# Patient Record
Sex: FEMALE | Race: WHITE | NOT HISPANIC OR LATINO | Employment: OTHER | URBAN - METROPOLITAN AREA
[De-identification: names, ages, dates, MRNs, and addresses within clinical notes are randomized per-mention and may not be internally consistent; named-entity substitution may affect disease eponyms.]

---

## 2017-04-01 ENCOUNTER — HOSPITAL ENCOUNTER (EMERGENCY)
Facility: HOSPITAL | Age: 62
Discharge: HOME | End: 2017-04-01
Attending: EMERGENCY MEDICINE
Payer: MEDICARE

## 2017-04-01 VITALS
SYSTOLIC BLOOD PRESSURE: 119 MMHG | WEIGHT: 246 LBS | TEMPERATURE: 98 F | HEART RATE: 82 BPM | OXYGEN SATURATION: 97 % | BODY MASS INDEX: 45.27 KG/M2 | HEIGHT: 62 IN | RESPIRATION RATE: 17 BRPM | DIASTOLIC BLOOD PRESSURE: 57 MMHG

## 2017-04-01 DIAGNOSIS — R10.13 EPIGASTRIC PAIN: Primary | ICD-10-CM

## 2017-04-01 LAB
ALBUMIN SERPL BCP-MCNC: 3.9 G/DL
ALP SERPL-CCNC: 123 U/L
ALT SERPL W/O P-5'-P-CCNC: 38 U/L
ANION GAP SERPL CALC-SCNC: 12 MMOL/L
AST SERPL-CCNC: 41 U/L
BACTERIA #/AREA URNS HPF: ABNORMAL /HPF
BASOPHILS # BLD AUTO: 0.02 K/UL
BASOPHILS NFR BLD: 0.2 %
BILIRUB SERPL-MCNC: 0.7 MG/DL
BILIRUB UR QL STRIP: NEGATIVE
BUN SERPL-MCNC: 17 MG/DL
CALCIUM SERPL-MCNC: 9.9 MG/DL
CHLORIDE SERPL-SCNC: 103 MMOL/L
CLARITY UR: ABNORMAL
CO2 SERPL-SCNC: 22 MMOL/L
COLOR UR: ABNORMAL
CREAT SERPL-MCNC: 1.7 MG/DL
DIFFERENTIAL METHOD: ABNORMAL
EOSINOPHIL # BLD AUTO: 0.2 K/UL
EOSINOPHIL NFR BLD: 2.5 %
ERYTHROCYTE [DISTWIDTH] IN BLOOD BY AUTOMATED COUNT: 13.4 %
EST. GFR  (AFRICAN AMERICAN): 37 ML/MIN/1.73 M^2
EST. GFR  (NON AFRICAN AMERICAN): 32 ML/MIN/1.73 M^2
GLUCOSE SERPL-MCNC: 185 MG/DL
GLUCOSE UR QL STRIP: NEGATIVE
HCT VFR BLD AUTO: 46 %
HGB BLD-MCNC: 15 G/DL
HGB UR QL STRIP: NEGATIVE
HYALINE CASTS #/AREA URNS LPF: 0 /LPF
KETONES UR QL STRIP: ABNORMAL
LEUKOCYTE ESTERASE UR QL STRIP: ABNORMAL
LIPASE SERPL-CCNC: 27 U/L
LYMPHOCYTES # BLD AUTO: 2.5 K/UL
LYMPHOCYTES NFR BLD: 29.3 %
MCH RBC QN AUTO: 31.9 PG
MCHC RBC AUTO-ENTMCNC: 32.6 %
MCV RBC AUTO: 98 FL
MICROSCOPIC COMMENT: ABNORMAL
MONOCYTES # BLD AUTO: 1.2 K/UL
MONOCYTES NFR BLD: 14 %
NEUTROPHILS # BLD AUTO: 4.6 K/UL
NEUTROPHILS NFR BLD: 54 %
NITRITE UR QL STRIP: NEGATIVE
PH UR STRIP: 5 [PH] (ref 5–8)
PLATELET # BLD AUTO: 234 K/UL
PMV BLD AUTO: 10.9 FL
POTASSIUM SERPL-SCNC: 4.6 MMOL/L
PROT SERPL-MCNC: 8.1 G/DL
PROT UR QL STRIP: ABNORMAL
RBC # BLD AUTO: 4.7 M/UL
RBC #/AREA URNS HPF: 0 /HPF (ref 0–4)
SODIUM SERPL-SCNC: 137 MMOL/L
SP GR UR STRIP: 1.02 (ref 1–1.03)
SQUAMOUS #/AREA URNS HPF: 12 /HPF
URN SPEC COLLECT METH UR: ABNORMAL
UROBILINOGEN UR STRIP-ACNC: NEGATIVE EU/DL
WBC # BLD AUTO: 8.57 K/UL
WBC #/AREA URNS HPF: 2 /HPF (ref 0–5)

## 2017-04-01 PROCEDURE — 81000 URINALYSIS NONAUTO W/SCOPE: CPT

## 2017-04-01 PROCEDURE — 25000003 PHARM REV CODE 250: Performed by: EMERGENCY MEDICINE

## 2017-04-01 PROCEDURE — 99283 EMERGENCY DEPT VISIT LOW MDM: CPT | Mod: 25

## 2017-04-01 PROCEDURE — 83690 ASSAY OF LIPASE: CPT

## 2017-04-01 PROCEDURE — 85025 COMPLETE CBC W/AUTO DIFF WBC: CPT

## 2017-04-01 PROCEDURE — 80053 COMPREHEN METABOLIC PANEL: CPT

## 2017-04-01 PROCEDURE — 96360 HYDRATION IV INFUSION INIT: CPT

## 2017-04-01 RX ORDER — SUCRALFATE 1 G/1
1 TABLET ORAL
Qty: 60 TABLET | Refills: 1 | Status: SHIPPED | OUTPATIENT
Start: 2017-04-01

## 2017-04-01 RX ORDER — ONDANSETRON 4 MG/1
8 TABLET, ORALLY DISINTEGRATING ORAL EVERY 12 HOURS PRN
Qty: 12 TABLET | Refills: 0 | Status: SHIPPED | OUTPATIENT
Start: 2017-04-01 | End: 2017-04-04

## 2017-04-01 RX ADMIN — SODIUM CHLORIDE 1000 ML: 0.9 INJECTION, SOLUTION INTRAVENOUS at 03:04

## 2017-04-01 NOTE — DISCHARGE INSTRUCTIONS
Epigastric Pain (Uncertain Cause)    Epigastric pain can be a sign of disease in the upper abdomen. Common causes include:  · Acid reflux (stomach acid flowing up into the esophagus)  · Gastritis (irritation of the stomach lining)  · Peptic Ulcer Disease  · Inflammation of the pancreas  · Gallstone  · Infection in the gallbladder  Pain may be dull or burning. It may spread upward to the chest or to the back. There may be other symptoms such as belching, bloating, cramps or hunger pains. There may be weight loss or poor appetite, nausea or vomiting.  Since the diagnosis of your pain is not certain yet, further tests may sometimes be needed. Sometimes the doctor will treat you for the most likely condition to see if there is improvement before doing further tests.  Home care  Medicines  · Antacids help neutralize the normal acids in your stomach. Examples are Maalox, Mylanta, Rolaids, and Tums. If you dont like the liquid, you can also try a chewable one. You may find one works better than another for you. Overuse can cause diarrhea or constipation.  · Acid blockers (H2 blockers) decrease acid production. Examples are cimetidine (Tagamet), famotidine (Pepcid) and ranitidine (Zantac).  · Acid inhibitors (PPIs) decrease acid production in a different way than the blockers. You may find they work better, but can take a little longer to take effect.  Examples are omeprazole (Prilosec), lansoprazole (Prevacid), pantoprazole (Protonix), rabeprazole (Aciphex), and esomeprazole (Nexium).  · Take an antacid 30-60 minutes after eating and at bedtime, but not at the same time as an acid blocker.  · Try not to take NSAIDs. Aspirin may also cause problems, but if taking it for your heart or other medical reasons, talk to your doctor before stopping it; you do not want to cause a worse problem, like a heart attack or stroke.  Diet  · If certain foods seem to cause your spasm, try to avoid them.   · Eat slowly and chew food well  before swallowing. Symptoms of gastritis can be worsened by certain foods. Limit or avoid fatty, fried, and spicy foods, as well as coffee, chocolate, mint, and foods with high acid content such as tomatoes and citrus fruit and juices (orange, grapefruit, lemon).  · Avoid alcohol, caffeine, and tobacco, which can delay healing and worsen your problem.  · Try eating smaller meals with snacks in between  Follow-up care  Follow up with your healthcare provider or as advised.  When to seek medical advice  Call your healthcare provider right away if any of the following occur:  · Stomach pain worsens or moves to the right lower part of the abdomen  · Chest pain appears, or if it worsens or spreads to the chest, back, neck, shoulder, or arm  · Frequent vomiting (cant keep down liquids)  · Blood in the stool or vomit (red or black color)  · Feeling weak or dizzy, fainting, or having trouble breathing  · Fever of 100.4ºF (38ºC) or higher, or as directed by your healthcare provider  · Abdominal swelling  Date Last Reviewed: 9/25/2015  © 2125-5387 [employer]. [address] [place] [address]. All rights reserved. This information is not intended as a substitute for professional medical care. Always follow your healthcare professional's instructions.

## 2017-04-01 NOTE — ED PROVIDER NOTES
"Encounter Date: 4/1/2017    SCRIBE #1 NOTE: I, Lorinkathy Goncalves, am scribing for, and in the presence of,  Jose Rahman MD. I have scribed the following portions of the note - Other sections scribed: HPI, ROS.       History     Chief Complaint   Patient presents with    Abdominal Pain     "Pancreatitis flaring up again" for 3 days now.    Diarrhea    Nausea     Review of patient's allergies indicates:   Allergen Reactions    Morphine Hives    Penicillins Hives    Sulfa (sulfonamide antibiotics) Hives     HPI Comments: CC: Abdominal Pain    HPI: This 61 year old female has a past medical history of anxiety, depression, diabetes mellitus, fibromyalgia, gout, high cholesterol, hypertension, polycystic kidney, and vertigo presents to the ED complaining of a three day history of epigastric abdominal pain radiating to her back. Pt reports abdominal pain is similar to pancreatitis pains she has experience in the past. During the time of the exam, pt says she feels 0/10 pain. Pt reports her PCP took her off of Mylanta, Tanzeum, and insulin. Pt was then placed on Humalog. Pt is allergic to morphine. No other symptoms reported.  Pain free now.       The history is provided by the patient.     Past Medical History:   Diagnosis Date    Anxiety     Depression     Diabetes mellitus     Fibromyalgia     Gout     High cholesterol     Hypertension     Polycystic kidney     Right    Vertigo      Past Surgical History:   Procedure Laterality Date    HYSTERECTOMY       No family history on file.  Social History   Substance Use Topics    Smoking status: Current Every Day Smoker    Smokeless tobacco: None    Alcohol use No     Review of Systems   Constitutional: Negative for fever.   HENT: Negative for sore throat.    Respiratory: Negative for shortness of breath.    Cardiovascular: Negative for chest pain.   Gastrointestinal: Positive for abdominal pain (epigastric radiating to back). Negative for nausea. "   Genitourinary: Negative for dysuria.   Musculoskeletal: Positive for back pain.   Skin: Negative for rash.   Neurological: Negative for weakness.   Hematological: Does not bruise/bleed easily.       Physical Exam   Initial Vitals   BP Pulse Resp Temp SpO2   04/01/17 1411 04/01/17 1411 04/01/17 1411 04/01/17 1411 04/01/17 1411   122/76 109 18 97.7 °F (36.5 °C) 96 %     Physical Exam    Vitals reviewed.  Constitutional: She appears well-developed and well-nourished.   HENT:   Head: Normocephalic and atraumatic.   Nose: Nose normal.   Mouth/Throat: No oropharyngeal exudate.   Eyes: EOM are normal. Pupils are equal, round, and reactive to light.   Neck: Normal range of motion. Neck supple. No JVD present.   Cardiovascular: Regular rhythm and normal heart sounds. Exam reveals no gallop and no friction rub.    No murmur heard.  Pulmonary/Chest: Breath sounds normal. No stridor. No respiratory distress. She has no wheezes. She has no rhonchi. She has no rales. She exhibits no tenderness.   Abdominal: Soft. Bowel sounds are normal. She exhibits no distension and no mass. There is no tenderness. There is no rebound and no guarding.   Musculoskeletal: Normal range of motion. She exhibits no edema or tenderness.   Neurological: She is alert and oriented to person, place, and time. She has normal strength. No sensory deficit.   Skin: Skin is warm and dry.   Psychiatric: She has a normal mood and affect. Thought content normal.         ED Course   Procedures  Labs Reviewed   CBC W/ AUTO DIFFERENTIAL - Abnormal; Notable for the following:        Result Value    MCH 31.9 (*)     Mono # 1.2 (*)     All other components within normal limits   COMPREHENSIVE METABOLIC PANEL - Abnormal; Notable for the following:     CO2 22 (*)     Glucose 185 (*)     Creatinine 1.7 (*)     AST 41 (*)     eGFR if  37 (*)     eGFR if non  32 (*)     All other components within normal limits   URINALYSIS - Abnormal;  Notable for the following:     Appearance, UA Cloudy (*)     Protein, UA 1+ (*)     Ketones, UA Trace (*)     Leukocytes, UA Trace (*)     All other components within normal limits   URINALYSIS MICROSCOPIC - Abnormal; Notable for the following:     Bacteria, UA Few (*)     All other components within normal limits   LIPASE        Medical decision-making:    The patient received a medical screening exam. If performed, the EKG was independently evaluated by me and is pending final cardiology evaluation.  If performed, all radiographic studies were independently evaluated by me and are pending final radiology evaluation. If labs were ordered, they were reviewed. Vital signs are independently assessed by me.  If performed, the pulse oximetry was independently evaluated by me.  I decided to obtain the patient's past medical record.  If available, I reviewed the patient's past medical record, including most recent labs and radiology reports.    This is an emergent evaluation of the patient complaining of abdominal pain.  My differential diagnosis includes mesenteric ischemia, obstruction, appendicitis, pancreatitis, cholecystitis, peptic ulcer disease,diverticulitis, colitis, volvulus, hernia, UTI, gastritis and gastroenteritis.    I decided to obtain and review the old medical record.    The patient does not clinically have gastroenteritis.  I consider, but doubt mesenteric ischemia, obstruction, appendicitis, cholecystitis, diverticulitis, colitis, and hernia.  The patient's urine is not infected.  The patient's labs are otherwise normal.  Lipase is normal. Doubt acute pancreatitis.     In the emergency department the patient was treated with:   Medications   sodium chloride 0.9% bolus 1,000 mL (0 mLs Intravenous Stopped 4/1/17 1632)         Pt is still pain free.     The patient will be discharged home with the following medications:  Patient will follow up with primary care physician.    The results and physical exam  findings were reviewed with the patient. Pt agrees with assessment, disposition and treatment plan and has no further questions or complaints at this time.      COURTNEY Rahman M.D. 8:31 AM 4/2/2017                    Scribe Attestation:   Scribe #1: I performed the above scribed service and the documentation accurately describes the services I performed. I attest to the accuracy of the note.    Attending Attestation:           Physician Attestation for Scribe:  Physician Attestation Statement for Scribe #1: I, Jose Rahman MD, reviewed documentation, as scribed by Devyn Goncalves in my presence, and it is both accurate and complete.                 ED Course     Clinical Impression:   The encounter diagnosis was Epigastric pain.          Jose Rahman MD  04/02/17 0832

## 2017-04-01 NOTE — ED TRIAGE NOTES
Pt presents to ED c/o pain on her right side behind her right flank that started two days ago.  States she also has nv and a fever last night.  States when she felt this pain in the past it was dx as pancreatitis.  Denies any pain at the moment.

## 2017-04-01 NOTE — ED AVS SNAPSHOT
[facility]  [address] [place] [address]               Mariah Arizmendi   2017  2:59 PM   ED    Description:  Female : 1955   Department:  [facility]           Your Care was Coordinated By:     Provider Role From To    Jose Rahman MD Attending Provider 17 1526 --      Reason for Visit     Abdominal Pain     Diarrhea     Nausea           Diagnoses this Visit        Comments    Epigastric pain    -  Primary       ED Disposition     ED Disposition Condition Comment    Discharge             To Do List           Follow-up Information     Schedule an appointment as soon as possible for a visit with Holly Brock MD.    Specialty:  Family Medicine    Contact information:    [address] [facility] [address]  [facility]  [facility] [id number] [phone]          Schedule an appointment as soon as possible for a visit with Renetta Sherwood MD.    Specialty:  Endocrinology    Contact information:    [address] [place] [address] [address]  [facility] [id number] [phone]          Follow up with [facility].    Specialty:  Emergency Medicine    Why:  As needed, If symptoms worsen    Contact information:    [address] [place] [address] [phone]       These Medications        Disp Refills Start End    sucralfate (CARAFATE) 1 gram tablet 60 tablet 1 2017     Take 1 tablet (1 g total) by mouth 4 (four) times daily before meals and nightly. - Oral    Pharmacy: [facility] [address] AT [facility] Ph #: [phone]       ondansetron (ZOFRAN-ODT) 4 MG TbDL 12 tablet 0 2017    Take 2 tablets (8 mg total) by mouth every 12 (twelve) hours as needed. - Oral    Pharmacy: [facility] [facility] [address] AT [facility] Ph #: [phone]         [facility]     [facility]  Care Line - 24/7 Assistance  Unless otherwise directed by your provider, please contact [facility], our nurse care line that is available for 24/7 assistance.     Registered nurses in the [facility] provide: appointment scheduling, clinical advisement, health education, and other advisory services.  Call: [phone] (toll free)               Medications           Message regarding Medications     Verify the changes and/or additions to your medication regime listed below are the same as discussed with your clinician today.  If any of these changes or additions are incorrect, please notify your healthcare provider.        START taking these NEW medications        Refills    sucralfate (CARAFATE) 1 gram tablet 1    Sig: Take 1 tablet (1 g total) by mouth 4 (four) times daily before meals and nightly.    Class: Print    Route: Oral    ondansetron (ZOFRAN-ODT) 4 MG TbDL 0    Sig: Take 2 tablets (8 mg total) by mouth every 12 (twelve) hours as needed.    Class: Print    Route: Oral      These medications were administered today        Dose Freq    sodium chloride 0.9% bolus 1,000 mL 1,000 mL ED 1 Time    Sig: Inject 1,000 mLs into the vein ED 1 Time.    Class: Normal    Route: Intravenous      STOP taking these medications     omeprazole (PRILOSEC) 20 MG capsule Take 20 mg by mouth once daily.           Verify that the below list of medications is an accurate representation of the medications you are currently taking.  If none reported, the list may be blank. If incorrect, please contact your healthcare provider. Carry this list with you in case of emergency.           Current Medications     amitriptyline (ELAVIL) 100 MG tablet Take 100 mg by mouth every evening.    aspirin (ECOTRIN) 81 MG EC tablet Take 81 mg by mouth once daily.    diclofenac potassium 50 mg PwPk Take by mouth.    ezetimibe (ZETIA) 10 mg tablet Take 10 mg by mouth once daily.    glipiZIDE (GLUCOTROL) 10 MG tablet Take 10 mg by  "mouth 2 (two) times daily before meals.    INSULIN ASPART PROTAM & ASPART (NOVOLOG MIX 70-30 FLEXPEN SUBQ) Inject 5-10 Units into the skin before dinner.    meclizine (ANTIVERT) 25 mg tablet Take 25 mg by mouth 3 (three) times daily as needed.    minocycline (MINOCIN,DYNACIN) 50 MG Cap Take 100 mg by mouth every 12 (twelve) hours.    ondansetron (ZOFRAN-ODT) 4 MG TbDL Take 2 tablets (8 mg total) by mouth every 12 (twelve) hours as needed.    pravastatin (PRAVACHOL) 20 MG tablet Take 20 mg by mouth once daily.    sucralfate (CARAFATE) 1 gram tablet Take 1 tablet (1 g total) by mouth 4 (four) times daily before meals and nightly.    valsartan (DIOVAN) 40 MG tablet Take 80 mg by mouth once daily.     vilazodone (VIIBRYD) 40 mg Tab tablet Take 40 mg by mouth once daily.           Clinical Reference Information           Your Vitals Were     BP Pulse Temp Resp Height Weight    122/76 (BP Location: Left arm, Patient Position: Sitting) 109 97.7 °F (36.5 °C) (Oral) 18 5' 2" (1.575 m) 111.6 kg (246 lb)    SpO2 BMI             96% 44.99 kg/m2         Allergies as of 4/1/2017        Reactions    Morphine Hives    Penicillins Hives    Sulfa (Sulfonamide Antibiotics) Hives      Immunizations Administered on Date of Encounter - 4/1/2017     None      ED Micro, Lab, POCT     Start Ordered       Status Ordering Provider    04/01/17 1527 04/01/17 1526    STAT,   Status:  Canceled      Canceled     04/01/17 1430 04/01/17 1429  CBC W/ AUTO DIFFERENTIAL  Once      Final result     04/01/17 1430 04/01/17 1429  Comp. Metabolic Panel  STAT      Final result     04/01/17 1430 04/01/17 1429  Lipase  STAT      Final result     04/01/17 1430 04/01/17 1429  Urinalysis - Clean Catch  STAT      Final result     04/01/17 1429 04/01/17 1429  Urinalysis Microscopic  Once      Final result       ED Imaging Orders     None        Discharge Instructions         Epigastric Pain (Uncertain Cause)    Epigastric pain can be a sign of disease in the upper " abdomen. Common causes include:  · Acid reflux (stomach acid flowing up into the esophagus)  · Gastritis (irritation of the stomach lining)  · Peptic Ulcer Disease  · Inflammation of the pancreas  · Gallstone  · Infection in the gallbladder  Pain may be dull or burning. It may spread upward to the chest or to the back. There may be other symptoms such as belching, bloating, cramps or hunger pains. There may be weight loss or poor appetite, nausea or vomiting.  Since the diagnosis of your pain is not certain yet, further tests may sometimes be needed. Sometimes the doctor will treat you for the most likely condition to see if there is improvement before doing further tests.  Home care  Medicines  · Antacids help neutralize the normal acids in your stomach. Examples are Maalox, Mylanta, Rolaids, and Tums. If you dont like the liquid, you can also try a chewable one. You may find one works better than another for you. Overuse can cause diarrhea or constipation.  · Acid blockers (H2 blockers) decrease acid production. Examples are cimetidine (Tagamet), famotidine (Pepcid) and ranitidine (Zantac).  · Acid inhibitors (PPIs) decrease acid production in a different way than the blockers. You may find they work better, but can take a little longer to take effect.  Examples are omeprazole (Prilosec), lansoprazole (Prevacid), pantoprazole (Protonix), rabeprazole (Aciphex), and esomeprazole (Nexium).  · Take an antacid 30-60 minutes after eating and at bedtime, but not at the same time as an acid blocker.  · Try not to take NSAIDs. Aspirin may also cause problems, but if taking it for your heart or other medical reasons, talk to your doctor before stopping it; you do not want to cause a worse problem, like a heart attack or stroke.  Diet  · If certain foods seem to cause your spasm, try to avoid them.   · Eat slowly and chew food well before swallowing. Symptoms of gastritis can be worsened by certain foods. Limit or avoid  fatty, fried, and spicy foods, as well as coffee, chocolate, mint, and foods with high acid content such as tomatoes and citrus fruit and juices (orange, grapefruit, lemon).  · Avoid alcohol, caffeine, and tobacco, which can delay healing and worsen your problem.  · Try eating smaller meals with snacks in between  Follow-up care  Follow up with your healthcare provider or as advised.  When to seek medical advice  Call your healthcare provider right away if any of the following occur:  · Stomach pain worsens or moves to the right lower part of the abdomen  · Chest pain appears, or if it worsens or spreads to the chest, back, neck, shoulder, or arm  · Frequent vomiting (cant keep down liquids)  · Blood in the stool or vomit (red or black color)  · Feeling weak or dizzy, fainting, or having trouble breathing  · Fever of 100.4ºF (38ºC) or higher, or as directed by your healthcare provider  · Abdominal swelling  Date Last Reviewed: 9/25/2015  © 4786-2480 [employer]. [address] [place] [address]. All rights reserved. This information is not intended as a substitute for professional medical care. Always follow your healthcare professional's instructions.          [facility] Sign-Up     Activating your [facility] account is as easy as 1-2-3!     1) Visit [email or web address], select Sign Up Now, enter this activation code and your date of birth, then select Next.  [email or web address]  Expires: 5/16/2017  4:49 PM      2) Create a username and password to use when you visit [facility] in the future and select a security question in case you lose your password and select Next.    3) Enter your e-mail address and click Sign Up!    Additional Information  If you have questions, please e-mail [email or web address] or call [phone] to talk to our [facility] staff. Remember, [facility] is NOT to be used for urgent needs. For medical emergencies, dial 911.         Smoking Cessation     If you would like to quit  smoking:   You may be eligible for free services if you are a [place] resident and started smoking cigarettes before September 1, 1988.  Call the Smoking Cessation Trust (SCT) toll free at (861) 300-5939 or (963) 123-5962.   Call 1-800-QUIT-NOW if you do not meet the above criteria.   Contact us via email: [email or web address]   View our website for more information: [email or web address]         [facility] complies with applicable Federal civil rights laws and does not discriminate on the basis of race, color, national origin, age, disability, or sex.        Language Assistance Services     ATTENTION: Language assistance services are available, free of charge. Please call [phone].      ATENCIÓN: Si habla maycol, tiene a petit disposición servicios gratuitos de asistencia lingüística. Llame al [phone].     CHÚ Ý: N?u b?n nói Ti?ng Vi?t, có các d?ch v? h? tr? ngôn ng? mi?n phí dành cho b?n. G?i s? [phone].

## 2017-04-01 NOTE — ED NOTES
Report received from Naman HARRELL RN. Pt rounding performed at this time. Pt assessed for pain, need of repositioning and given opportunity to use the restroom. Pt rates pain (0/10 at this time, states it is intermittent), denies need to use the restroom and can position for comfort. Room assessed for safety measures and cleanliness, no action needed at this time. Pt denies any additional needs at this time. Pt is up to date on plan of care and denies questions or concerns. Pt informed I will return in one hour, if needed before then, pt given call bell and demonstrates use. Pt verbalizes understanding.

## 2017-09-03 ENCOUNTER — HOSPITAL ENCOUNTER (EMERGENCY)
Facility: HOSPITAL | Age: 62
Discharge: HOME | End: 2017-09-03
Attending: EMERGENCY MEDICINE
Payer: MEDICARE

## 2017-09-03 VITALS
HEART RATE: 100 BPM | WEIGHT: 235 LBS | BODY MASS INDEX: 43.24 KG/M2 | SYSTOLIC BLOOD PRESSURE: 120 MMHG | RESPIRATION RATE: 18 BRPM | TEMPERATURE: 99 F | OXYGEN SATURATION: 96 % | DIASTOLIC BLOOD PRESSURE: 69 MMHG | HEIGHT: 62 IN

## 2017-09-03 DIAGNOSIS — R05.9 COUGH: ICD-10-CM

## 2017-09-03 DIAGNOSIS — R10.9 ACUTE RIGHT FLANK PAIN: ICD-10-CM

## 2017-09-03 DIAGNOSIS — N20.0 BILATERAL NEPHROLITHIASIS: Primary | ICD-10-CM

## 2017-09-03 LAB
BILIRUB UR QL STRIP: NEGATIVE
CLARITY UR: CLEAR
COLOR UR: YELLOW
GLUCOSE UR QL STRIP: ABNORMAL
HGB UR QL STRIP: NEGATIVE
KETONES UR QL STRIP: NEGATIVE
LEUKOCYTE ESTERASE UR QL STRIP: NEGATIVE
NITRITE UR QL STRIP: NEGATIVE
PH UR STRIP: 5 [PH] (ref 5–8)
POCT GLUCOSE: 219 MG/DL (ref 70–110)
PROT UR QL STRIP: NEGATIVE
SP GR UR STRIP: 1.02 (ref 1–1.03)
URN SPEC COLLECT METH UR: ABNORMAL
UROBILINOGEN UR STRIP-ACNC: NEGATIVE EU/DL

## 2017-09-03 PROCEDURE — 81003 URINALYSIS AUTO W/O SCOPE: CPT

## 2017-09-03 PROCEDURE — 82962 GLUCOSE BLOOD TEST: CPT

## 2017-09-03 PROCEDURE — 99284 EMERGENCY DEPT VISIT MOD MDM: CPT | Mod: 25

## 2017-09-03 RX ORDER — HYDROCODONE BITARTRATE AND ACETAMINOPHEN 5; 325 MG/1; MG/1
1 TABLET ORAL EVERY 4 HOURS PRN
Qty: 10 TABLET | Refills: 0 | Status: SHIPPED | OUTPATIENT
Start: 2017-09-03 | End: 2017-09-13

## 2017-09-03 RX ORDER — ONDANSETRON 4 MG/1
4 TABLET, ORALLY DISINTEGRATING ORAL EVERY 8 HOURS PRN
Qty: 15 TABLET | Refills: 0 | Status: SHIPPED | OUTPATIENT
Start: 2017-09-03

## 2017-09-03 RX ORDER — TAMSULOSIN HYDROCHLORIDE 0.4 MG/1
0.4 CAPSULE ORAL DAILY
Qty: 10 CAPSULE | Refills: 0 | Status: SHIPPED | OUTPATIENT
Start: 2017-09-03 | End: 2018-09-03

## 2017-09-04 NOTE — DISCHARGE INSTRUCTIONS
It appears you have small kidney stones on both sides.    Please take Ibuprofen, 600 mg every 6 hours, with food for pain and inflammation.  You can take Norco for breakthrough pain, as needed. Please do not take Norco while working, drinking alcohol, driving/operating heavy machinery, swimming. It may cause drowsiness, impair judgment, and reduce physical capabilities.    Take Flomax daily when you are having symptoms.  It helps relax the ureters to more easily pass the stones.    You can take zofran for nausea or vomiting, as needed.    Follow-up with the urologist for further evaluation of your kidney stones, if no improvement of symptoms within 1 week.    Return to the emergency room for any new or worsening symptoms or concerns.

## 2017-09-04 NOTE — ED PROVIDER NOTES
Encounter Date: 9/3/2017    SCRIBE #1 NOTE: I, Bertin Huddleston II, am scribing for, and in the presence of,  Kylie Marsh NP. I have scribed the following portions of the note - Other sections scribed: HPI and ROS.       History     Chief Complaint   Patient presents with    Back Pain     middle to lower back pain on right side. started x 2 days ago. +nausea.  denies diarrhea, fever, or vomiting.  denies urinary symptoms.     CC: Back Pain      HPI: This 61 y.o. female with HTN, NIDDM, depression, gout, vertigo, high cholesterol, fibromyalgia, anxiety, right polycystic kidney presents to the ED c/o acute onset, severe (10/10), atraumatic, right lumbar back pain with nausea x2 days. Pt states she is unsure of what could have caused the pain. No prior tx attempted. No alleviating factors. Pain is exacerbated with palpation and exertion. Pt denies abdominal pain, dysuria, frequency, and numbness.      The history is provided by the patient. No  was used.     Review of patient's allergies indicates:   Allergen Reactions    Morphine Hives    Penicillins Hives    Sulfa (sulfonamide antibiotics) Hives     Past Medical History:   Diagnosis Date    Anxiety     Depression     Diabetes mellitus     Fibromyalgia     Gout     High cholesterol     Hypertension     Polycystic kidney     Right    Vertigo      Past Surgical History:   Procedure Laterality Date    HYSTERECTOMY       History reviewed. No pertinent family history.  Social History   Substance Use Topics    Smoking status: Current Every Day Smoker    Smokeless tobacco: Not on file    Alcohol use No     Review of Systems   Constitutional: Negative for chills, diaphoresis and fever.   Eyes:        (-) eye problems   Respiratory: Negative for cough, chest tightness, shortness of breath and wheezing.    Cardiovascular: Negative for chest pain, palpitations and leg swelling.   Gastrointestinal: Positive for nausea. Negative for abdominal pain,  diarrhea and vomiting.   Genitourinary: Negative for dysuria.   Musculoskeletal: Positive for back pain (lumbar).        (-) arm or leg pain   Skin: Negative for rash.   Neurological: Negative for headaches.       Physical Exam     Initial Vitals [09/03/17 1847]   BP Pulse Resp Temp SpO2   134/65 110 18 98.4 °F (36.9 °C) 96 %      MAP       88         Physical Exam    Nursing note and vitals reviewed.  Constitutional: Vital signs are normal. She appears well-developed and well-nourished. She is Obese . She is active and cooperative. She does not appear ill. No distress.   Cardiovascular: S1 normal, S2 normal and normal heart sounds. Tachycardia present.    Pulmonary/Chest: Effort normal and breath sounds normal. No accessory muscle usage. No tachypnea and no bradypnea. No respiratory distress. She has no decreased breath sounds. She has no wheezes. She has no rhonchi. She has no rales.   Abdominal: Soft. Normal appearance. There is tenderness (right flank). There is no rigidity, no rebound, no guarding and no CVA tenderness.   Musculoskeletal:        Lumbar back: She exhibits tenderness (mild tenderness to right thoracic/lumbar back) and pain. She exhibits normal range of motion, no bony tenderness, no swelling, no edema, no deformity, no laceration, no spasm and normal pulse.        Back:    Neurological: She is alert and oriented to person, place, and time.   Skin: Skin is warm and dry. Capillary refill takes less than 2 seconds.         ED Course   Procedures  Labs Reviewed   URINALYSIS - Abnormal; Notable for the following:        Result Value    Glucose, UA 1+ (*)     All other components within normal limits   POCT GLUCOSE - Abnormal; Notable for the following:     POCT Glucose 219 (*)     All other components within normal limits   POCT GLUCOSE MONITORING CONTINUOUS          X-Rays:   Independently Interpreted Readings:   Chest X-Ray: Normal heart size.  No infiltrates.  No acute abnormalities.         Additional MDM:   Comments: This is an urgent evaluation of a 61-year-old female that presents to the emergency room complaining of atraumatic right lower back pain for 2 days.  Patient reports pain is intermittent, sharp, and radiating to her right flank.  She reports associated intermittent nausea, which usually resolves when pain subsides.  She denies fevers, urinary symptoms.  She does have a history of nephrolithiasis.  She is nontoxic appearing and afebrile on exam.  She is mildly tachycardic with heart rate of 110 bpm.  She also reports a chronic cough (she is a smoker), but states that the phlegm has been green recently.  She denies wheezing, chest pain, or shortness of breath.  She has not tachypnea, labored, or hypoxic.  Her abdominal exam is benign.  Her differential diagnoses included: Pneumonia, renal colic, UTI, DKA, MSK pain  I have a low suspicion for PE based on her clinical presentation.  I also considered but doubt appendicitis.  Chest x-ray was negative for acute findings.  Her Accu-Chek was 219-DKA unlikely.  Her urine did not appear infected.  CT renal shows bilateral punctate nonobstructing stones.  The appendix was not identified, but there were no inflammatory changes noted within the region. There were no other acute findings to explain the patient's pain. UA without infection.  Based on today's exam, I suspect that her symptoms are due to urolithiasis.  She is comfortable on reevaluation and denies any acute pain or nausea.  Her abdomen continues to be benign.  I believe she is stable for discharge and outpatient evaluation.  She has a history of calcium oxalate stones.  Her urologist no longer practices.  Will give referral.  Rx flomax, antiemetics, pain control.    Return precautions given..          Scribe Attestation:   Scribe #1: I performed the above scribed service and the documentation accurately describes the services I performed. I attest to the accuracy of the  note.    Attending Attestation:           Physician Attestation for Scribe:  Physician Attestation Statement for Scribe #1: I, Kylie Marsh NP, reviewed documentation, as scribed by Bertin Huddleston II in my presence, and it is both accurate and complete.                 ED Course      Clinical Impression:   The primary encounter diagnosis was Bilateral nephrolithiasis. Diagnoses of Cough and Acute right flank pain were also pertinent to this visit.    Disposition:   Disposition: Discharged  Condition: Stable                        Kylie Marsh NP  09/03/17 [id number]

## 2017-09-04 NOTE — ED TRIAGE NOTES
"Pt c/o right upper back pain began 3 days ago non radiating denies injury and reports a slight cough but its "due to smoking"  "